# Patient Record
Sex: FEMALE | Race: WHITE
[De-identification: names, ages, dates, MRNs, and addresses within clinical notes are randomized per-mention and may not be internally consistent; named-entity substitution may affect disease eponyms.]

---

## 2018-05-07 ENCOUNTER — HOSPITAL ENCOUNTER (OUTPATIENT)
Dept: HOSPITAL 59 - SUR | Age: 78
Discharge: HOME | End: 2018-05-07
Attending: ORTHOPAEDIC SURGERY
Payer: MEDICARE

## 2018-05-07 DIAGNOSIS — M17.11: ICD-10-CM

## 2018-05-07 DIAGNOSIS — E78.00: ICD-10-CM

## 2018-05-07 DIAGNOSIS — S83.206A: Primary | ICD-10-CM

## 2018-05-07 DIAGNOSIS — I10: ICD-10-CM

## 2018-05-07 LAB
ANION GAP SERPL CALC-SCNC: 14 MMOL/L (ref 7–16)
BASOPHILS NFR BLD: 0.4 % (ref 0–6)
BUN SERPL-MCNC: 17 MG/DL (ref 8–23)
CO2 SERPL-SCNC: 29 MMOL/L (ref 22–29)
CREAT SERPL-MCNC: 1 MG/DL (ref 0.5–0.9)
EOSINOPHIL NFR BLD: 1.9 % (ref 0–6)
ERYTHROCYTE [DISTWIDTH] IN BLOOD BY AUTOMATED COUNT: 13.2 % (ref 11.5–14.5)
EST GLOMERULAR FILTRATION RATE: 57 ML/MIN
GLUCOSE SERPL-MCNC: 104 MG/DL (ref 74–109)
GRANULOCYTES NFR BLD: 54.2 % (ref 47–80)
HCT VFR BLD CALC: 42.6 % (ref 35–47)
HGB BLD-MCNC: 13.8 GM/DL (ref 11.6–16)
LYMPHOCYTES NFR BLD AUTO: 36.5 % (ref 16–45)
MCH RBC QN AUTO: 29.7 PG (ref 27–33)
MCHC RBC AUTO-ENTMCNC: 32.4 G/DL (ref 32–36)
MCV RBC AUTO: 91.8 FL (ref 81–97)
MONOCYTES NFR BLD: 7 % (ref 0–9)
PLATELET # BLD: 249 K/UL (ref 130–400)
PMV BLD AUTO: 9.6 FL (ref 7.4–10.4)
RBC # BLD AUTO: 4.64 M/UL (ref 3.8–5.4)
WBC # BLD AUTO: 5.3 K/UL (ref 4.2–12.2)

## 2018-05-07 PROCEDURE — 80048 BASIC METABOLIC PNL TOTAL CA: CPT

## 2018-05-07 PROCEDURE — 85025 COMPLETE CBC W/AUTO DIFF WBC: CPT

## 2018-05-07 NOTE — OPERATIVE NOTE
DATE OF SURGERY:  05/07/2018.



PREOPERATIVE DIAGNOSIS:  

1.  RIGHT KNEE ARTHROSIS.

2.  MENISCUS TEAR. 



POSTOPERATIVE DIAGNOSIS:  

1.  RIGHT KNEE ARTHROSIS.

2.  MENISCUS TEAR.



PROCEDURE:

1.  DIAGNOSTIC ARTHROSCOPY.

2.  ARTHROSCOPIC PARTIAL MEDIAL AND LATERAL MENISCECTOMIES.

3.  ARTHROSCOPIC DEBRIDEMENT AND CHONDROPLASTY OF PATELLA, FEMORAL TROCHLEA, 
MEDIAL FEMORAL CONDYLE, AND MEDIAL TIBIAL PLATEAU.



SURGEON:  PAOLO TEAGUE M.D.



ANESTHESIA:  GENERAL ENDOTRACHEAL.



ANESTHESIA PROVIDER: JOHNNA MORALEZ CRNA.



COMPLICATIONS:  NONE. 



BLOOD LOSS:  MINIMAL.



OPERATIVE FINDINGS:  HEMARTHROSIS, LARGE KNEE EFFUSION, DEGENERATIVE TEARING 
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 
THE ENTIRE MEDIAL COMPARTMENT AND GRADE 4 BASICALLY THROUGHOUT THE ENTIRE 
PATELLOFEMORAL COMPARTMENT.  



INDICATION FOR OPERATION:  This 78-year-old female is well known to myself. She 
is status post multiple aspirations and injection of steroid for persistent 
knee effusion and arthrosis and she continued to have the same symptoms. She 
was just recently aspirated and injected with steroid and she returned the 
following week with the same large effusion the recurred in a couple days and 
she was scheduled for arthroscopic surgery. I explained all the risks and 
benefits to her in detail including but not limited to infection, nerve injury, 
vessel injury, persistent pain, stiffness, numbness and tingling in the knee, 
the fact that she has arthrosis and that this procedure would not cure that 
condition and could require even further procedures. All of her questions were 
answered. Rehab and course were outlined and she agreed to proceed. 



PROCEDURE:  The patient brought to O.R. and placed in the supine position for 
surgery. Nonsterile leg beckham was previously applied and her right lower 
extremity was prepped and draped in the usual sterile fashion. The right knee 
was prepped again with ChloraPrep and draped. Intraoperative time-out was 
performed. 



Preoperatively, the knee exam revealed effusion. However, negative Jessie's 
test and no instability. 



Next, knee injected with 0.5% Marcaine with Epinephrine. Standard superior 
lateral inflow port was established. Inferior medial and lateral ports were 
established and diagnostic arthroscopy performed. 



Suprapatellar pouch had some synovitis and inflammation. She had hemarthrosis 
as well in the knee. The medial gutter had some synovitis. In the medial 
compartment, significant degenerative arthrosis, grade 3 to 4 chondromalacia 
throughout, significant degeneration and shredded tearing of the entire medial 
meniscus. Basically, we used a combination of basket biters and douglas and 
removed about three-quarters of the meniscus back to a smooth stable surface 
and also trimmed and smoothed loose cartilage fragments here.



The intracondylar notch revealed degenerative tearing of the ACL. The PCL was 
intact. 



The lateral compartment showed some mild degenerative tearing of the lateral 
meniscus. We used a combination of basket biter and douglas to removed that to 
a smooth stable surface. The cartilage here was basically intact.



The lateral gutter was normal.



The patellofemoral compartment had significant disease, grade 4 basically 
throughout, raw bone. Here, any loose cartilage was lightly trimmed with a 
shaver and performed synovectomy as well in the suprapatellar pouch and gutters.



This completed our procedures. There was a moderate amount of bleeding. The 
nurse indicated she had been on Baby Aspirin before. 



Next, we removed the scope. Equipment was removed. Bolus into incision with 0.5
% Marcaine with Epinephrine and Exparel and 80 mg DepoMedrol. Sterile dressing 
applied. ACE wrap. 



The patient tolerated the procedures well. No intraoperative complications. All 
sponge, needle, and blade counts correct.  Recovery Room stable, 
neurovascularly intact. She will be discharged as an outpatient. She will have 
[facility] and nurse and follow-up in two weeks.



cc:  Dr. Jessee Muhammad



JOB NUMBER:  [id number]
MTDD